# Patient Record
Sex: MALE | Race: WHITE | NOT HISPANIC OR LATINO | Employment: FULL TIME | URBAN - METROPOLITAN AREA
[De-identification: names, ages, dates, MRNs, and addresses within clinical notes are randomized per-mention and may not be internally consistent; named-entity substitution may affect disease eponyms.]

---

## 2018-02-15 DIAGNOSIS — R06.02 SOB (SHORTNESS OF BREATH): Primary | ICD-10-CM

## 2018-02-16 ENCOUNTER — OFFICE VISIT (OUTPATIENT)
Dept: PULMONOLOGY | Facility: CLINIC | Age: 43
End: 2018-02-16
Payer: COMMERCIAL

## 2018-02-16 ENCOUNTER — PROCEDURE VISIT (OUTPATIENT)
Dept: PULMONOLOGY | Facility: CLINIC | Age: 43
End: 2018-02-16
Payer: COMMERCIAL

## 2018-02-16 ENCOUNTER — HOSPITAL ENCOUNTER (OUTPATIENT)
Dept: RADIOLOGY | Facility: HOSPITAL | Age: 43
Discharge: HOME | End: 2018-02-16
Attending: INTERNAL MEDICINE
Payer: COMMERCIAL

## 2018-02-16 VITALS
RESPIRATION RATE: 19 BRPM | HEART RATE: 86 BPM | WEIGHT: 220.13 LBS | OXYGEN SATURATION: 96 % | SYSTOLIC BLOOD PRESSURE: 128 MMHG | BODY MASS INDEX: 32.6 KG/M2 | HEIGHT: 69 IN | DIASTOLIC BLOOD PRESSURE: 82 MMHG

## 2018-02-16 DIAGNOSIS — R06.02 SOB (SHORTNESS OF BREATH): ICD-10-CM

## 2018-02-16 DIAGNOSIS — J45.30 MILD PERSISTENT ASTHMA WITHOUT COMPLICATION: Primary | ICD-10-CM

## 2018-02-16 LAB
PRE DLCO: 34.23 ML/MMHG/MIN (ref 25.97–34.26)
PRE ERV: 0.48 L
PRE FEF 25 75: 1.83 L/S (ref 3–4.54)
PRE FET 100: 12.06 S
PRE FEV1 FVC: 72 %
PRE FEV1: 2.85 L (ref 3.64–4.4)
PRE FIF 50: 2.26 L/S
PRE FRC PL: 2.88 L (ref 2.65–3.86)
PRE FVC: 3.97 L (ref 4.62–5.52)
PRE KROGHS K: 4.03 1/MIN
PRE PEF: 8.21 L/S (ref 8.75–11)
PRE RV: 1.73 L (ref 1.6–2.39)
PRE SVC: 3.97 L
PRE TLC: 5.7 L (ref 6.19–7.19)
PREDICTED DLCO: 30.11 ML/MMHG/MIN (ref 25.97–34.26)
PREDICTED FEV1 FVC: 79.39 % (ref 74.55–84.23)
PREDICTED FEV1: 4.02 L (ref 3.64–4.4)
PREDICTED FRC N2: 3.25 L (ref 2.65–3.86)
PREDICTED FRC PL: 3.25 L (ref 2.65–3.86)
PREDICTED FVC: 5.07 L (ref 4.62–5.52)
PREDICTED RV: 2 L (ref 1.6–2.39)
PREDICTED SVC: 4.74 L
PREDICTED TLC: 6.69 L (ref 6.19–7.19)
PROVOCATION PROTOCOL: ABNORMAL

## 2018-02-16 PROCEDURE — 71046 X-RAY EXAM CHEST 2 VIEWS: CPT | Mod: TC,FY,PO

## 2018-02-16 PROCEDURE — 94010 BREATHING CAPACITY TEST: CPT | Mod: S$GLB,,, | Performed by: INTERNAL MEDICINE

## 2018-02-16 PROCEDURE — 99999 PR PBB SHADOW E&M-EST. PATIENT-LVL III: CPT | Mod: PBBFAC,,, | Performed by: INTERNAL MEDICINE

## 2018-02-16 PROCEDURE — 99204 OFFICE O/P NEW MOD 45 MIN: CPT | Mod: 25,S$GLB,, | Performed by: INTERNAL MEDICINE

## 2018-02-16 PROCEDURE — 94729 DIFFUSING CAPACITY: CPT | Mod: S$GLB,,, | Performed by: INTERNAL MEDICINE

## 2018-02-16 PROCEDURE — 94726 PLETHYSMOGRAPHY LUNG VOLUMES: CPT | Mod: S$GLB,,, | Performed by: INTERNAL MEDICINE

## 2018-02-16 PROCEDURE — 71046 X-RAY EXAM CHEST 2 VIEWS: CPT | Mod: 26,,, | Performed by: RADIOLOGY

## 2018-02-16 PROCEDURE — 3008F BODY MASS INDEX DOCD: CPT | Mod: S$GLB,,, | Performed by: INTERNAL MEDICINE

## 2018-02-16 RX ORDER — FLUTICASONE FUROATE AND VILANTEROL 100; 25 UG/1; UG/1
1 POWDER RESPIRATORY (INHALATION) DAILY
COMMUNITY
End: 2018-02-16 | Stop reason: SDUPTHER

## 2018-02-16 RX ORDER — ALBUTEROL SULFATE 0.83 MG/ML
2.5 SOLUTION RESPIRATORY (INHALATION) EVERY 6 HOURS PRN
COMMUNITY
End: 2018-02-16 | Stop reason: CLARIF

## 2018-02-16 RX ORDER — OMEPRAZOLE 20 MG/1
20 TABLET, DELAYED RELEASE ORAL DAILY
COMMUNITY
End: 2021-06-04

## 2018-02-16 RX ORDER — ALBUTEROL SULFATE 90 UG/1
2 AEROSOL, METERED RESPIRATORY (INHALATION) EVERY 6 HOURS PRN
Qty: 18 G | Refills: 0 | Status: SHIPPED | OUTPATIENT
Start: 2018-02-16 | End: 2020-03-27

## 2018-02-16 RX ORDER — FLUTICASONE FUROATE AND VILANTEROL 100; 25 UG/1; UG/1
1 POWDER RESPIRATORY (INHALATION) DAILY
Qty: 60 EACH | Refills: 3 | Status: SHIPPED | OUTPATIENT
Start: 2018-02-16 | End: 2020-03-27 | Stop reason: SDUPTHER

## 2018-02-16 NOTE — PROGRESS NOTES
Initial Outpatient Pulmonary Evaluation       SUBJECTIVE:     History of Present Illness:  Patient is a 42 y.o. male presenting for evaluation after having abnormal spirometry, during his routine health exam at his job. He is waiting to be evaluated by pulmonary before resuming his job. He is   The patient is known with past medical history of asthma since childhood, according to the patient history his asthma seems mild and he has been on Advair 3 years ago for a year then recently over the last year and a half he was supposed to be on Breo 100 µg 1 puff daily however he was not persistently using it.   He denies hospital admission due to asthma, however he has had multiple doctor visits due to uncontrolled asthma.   He has intermittent dry cough, intermittent wheezing, chest tightness and SOB.  He uses albuterol on average once every 2-3 weeks.  He denies nighttime symptoms.  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  No recent sick contacts.     Chief Complaint   Patient presents with    Asthma       Review of patient's allergies indicates:  No Known Allergies    Current Outpatient Prescriptions   Medication Sig Dispense Refill    fluticasone-vilanterol (BREO ELLIPTA) 100-25 mcg/dose diskus inhaler Inhale 1 puff into the lungs once daily. Controller 60 each 3    omeprazole (PRILOSEC OTC) 20 MG tablet Take 20 mg by mouth once daily.      albuterol 90 mcg/actuation inhaler Inhale 2 puffs into the lungs every 6 (six) hours as needed for Wheezing. Rescue 18 g 0     No current facility-administered medications for this visit.        History reviewed. No pertinent past medical history.  History reviewed. No pertinent surgical history.  Family History   Problem Relation Age of Onset    Cancer Father     Cancer Sister      Social History   Substance Use Topics    Smoking status: Never Smoker    Smokeless tobacco: Never  "Used    Alcohol use Yes      Comment: socially        Review of Systems:  Review of Systems   Constitutional: Negative for chills, fever and unexpected weight change.   HENT: Negative for congestion.    Respiratory: Positive for cough, chest tightness, shortness of breath and wheezing.    Cardiovascular: Negative.    Gastrointestinal: Negative for abdominal pain.   Genitourinary: Negative for hematuria.   Musculoskeletal: Negative for back pain and gait problem.   Allergic/Immunologic: Negative for immunocompromised state.   Neurological: Negative for speech difficulty and weakness.   Hematological: Negative.    Psychiatric/Behavioral: Negative for agitation, behavioral problems and confusion.       OBJECTIVE:     Vital Signs (Most Recent)  Pulse: 86 (02/16/18 1552)  Resp: 19 (02/16/18 1552)  BP: 128/82 (02/16/18 1552)  SpO2: 96 % (02/16/18 1552)  5' 9" (1.753 m)  99.9 kg (220 lb 2.1 oz)     Physical Exam:  Physical Exam   Constitutional: He is oriented to person, place, and time. He appears well-developed and well-nourished.   HENT:   Head: Normocephalic and atraumatic.   Eyes: EOM are normal.   Neck: Neck supple.   Cardiovascular: Normal rate and regular rhythm.    Pulmonary/Chest: Effort normal. No respiratory distress. He has wheezes. He has no rales. He exhibits no tenderness.   Few scattered faint end expiratory wheezing bilaterally.   Abdominal: Soft. There is no tenderness.   Musculoskeletal: He exhibits no edema or deformity.   Neurological: He is alert and oriented to person, place, and time.   Skin: Skin is warm.       Laboratory    Lab Results   Component Value Date    WBC 8.09 02/27/2014    HGB 13.9 (L) 02/27/2014    HCT 42.0 02/27/2014    MCV 85 02/27/2014     02/27/2014     BMP  Lab Results   Component Value Date     02/27/2014    K 4.3 02/27/2014     02/27/2014    CO2 27 02/27/2014    BUN 19 02/27/2014    CREATININE 1.2 02/27/2014    CALCIUM 9.4 02/27/2014    ANIONGAP 9 02/27/2014 "    ESTGFRAFRICA >60.0 02/27/2014    EGFRNONAA >60.0 02/27/2014     BNP  @LABRCNTIP(BNP,BNPTRIAGEBLO)@  No results found for: TSH  ABG  @LABRCNTIP(PH,PO2,PCO2,HCO3,BE)@    Diagnostic Results:    Chest x-ray done today showed no acute findings and was postoperatively reviewed.  Spirometry done at the patient's job showed an FEV1 of 63% .     PFT done today was reviewed and showed small airways obstruction and reduced FEV1, mildly reduced FVC.  FEV1 over FVC 70%, TLC  lung volume was normal, DLCO was normal.  Bronchodilation was not assessed.      ASSESSMENT/PLAN:     Mild persistent asthma without complication  -     fluticasone-vilanterol (BREO ELLIPTA) 100-25 mcg/dose diskus inhaler; Inhale 1 puff into the lungs once daily. Controller  Dispense: 60 each; Refill: 3  -     albuterol 90 mcg/actuation inhaler; Inhale 2 puffs into the lungs every 6 (six) hours as needed for Wheezing. Rescue  Dispense: 18 g; Refill: 0  -     Spirometry with/without bronchodilator; Future    I have instructed the patient and advised him to use albuterol as a rescue inhaler as needed for shortness of breath, cough and wheezing.  I have also educated him about Breo, that it is a control inhaler that needs to be taken as prescribed on a daily basis.   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   I will repeat his spirometry in 1 week before making further recommendation    Follow-up in about 1 week (around 2/23/2018).    This note was prepared using voice recognition system and is likely to have sound alike errors that may have been overlooked even after proof reading.  Please call me with any questions    Discussed diagnosis, its evaluation, treatment and usual course. All questions answered.    Thank you for the courtesy of participating in the care of this patient    Bob Choudhary MD

## 2018-03-01 ENCOUNTER — OFFICE VISIT (OUTPATIENT)
Dept: PULMONOLOGY | Facility: CLINIC | Age: 43
End: 2018-03-01
Payer: COMMERCIAL

## 2018-03-01 ENCOUNTER — PROCEDURE VISIT (OUTPATIENT)
Dept: PULMONOLOGY | Facility: CLINIC | Age: 43
End: 2018-03-01
Payer: COMMERCIAL

## 2018-03-01 VITALS
DIASTOLIC BLOOD PRESSURE: 70 MMHG | HEART RATE: 77 BPM | HEIGHT: 69 IN | WEIGHT: 220.25 LBS | RESPIRATION RATE: 16 BRPM | BODY MASS INDEX: 32.62 KG/M2 | SYSTOLIC BLOOD PRESSURE: 136 MMHG | OXYGEN SATURATION: 98 %

## 2018-03-01 DIAGNOSIS — J45.30 MILD PERSISTENT ASTHMA WITHOUT COMPLICATION: ICD-10-CM

## 2018-03-01 DIAGNOSIS — G47.33 OSA ON CPAP: ICD-10-CM

## 2018-03-01 DIAGNOSIS — J45.30 MILD PERSISTENT ASTHMA, UNCOMPLICATED: Primary | ICD-10-CM

## 2018-03-01 DIAGNOSIS — K21.9 CHRONIC GERD: ICD-10-CM

## 2018-03-01 LAB
PRE FEF 25 75: 2.1 L/S (ref 3–4.54)
PRE FET 100: 11.34 S
PRE FEV1 FVC: 75 %
PRE FEV1: 2.82 L (ref 3.64–4.4)
PRE FIF 50: 3.87 L/S
PRE FVC: 3.77 L (ref 4.62–5.52)
PRE PEF: 8.34 L/S (ref 8.75–11)
PREDICTED FEV1 FVC: 79.39 % (ref 74.55–84.23)
PREDICTED FEV1: 4.02 L (ref 3.64–4.4)
PREDICTED FVC: 5.07 L (ref 4.62–5.52)
PROVOCATION PROTOCOL: ABNORMAL

## 2018-03-01 PROCEDURE — 99214 OFFICE O/P EST MOD 30 MIN: CPT | Mod: S$GLB,,, | Performed by: INTERNAL MEDICINE

## 2018-03-01 PROCEDURE — 94010 BREATHING CAPACITY TEST: CPT | Mod: S$GLB,,, | Performed by: INTERNAL MEDICINE

## 2018-03-01 PROCEDURE — 99999 PR PBB SHADOW E&M-EST. PATIENT-LVL III: CPT | Mod: PBBFAC,,, | Performed by: INTERNAL MEDICINE

## 2018-03-01 NOTE — PROGRESS NOTES
Pulmonary Outpatient Follow Up Visit     Subjective:       Patient ID: Carlo Julien is a 42 y.o. male.    Chief Complaint: Asthma      HPI  42-year-old male patient evaluated 2 weeks ago for abnormal spirometry and mild persistent asthma.  He comes in today for a follow-up w a repeat spirometry.  He denies coughing, wheezing, shortness of breath.  He did not need his albuterol inhaler over the last 2 weeks.  He is compliant with being 100 µg 1 puff daily.  Patient has severe obstructive sleep apnea on CPAP 11 cm daily.  He has been on CPAP for more than one year and he states that he is compliant with it.  Review of Systems   Constitutional: Negative for fever and chills.   HENT: Negative for trouble swallowing and voice change.         DEBRA   Eyes: Negative for redness.   Respiratory: Negative for cough, shortness of breath and dyspnea on extertion.         History of asthma   Cardiovascular: Negative for chest pain.   Genitourinary: Negative for hematuria.   Endocrine: endocrine negative   Musculoskeletal: Negative for gait problem.   Skin: Negative for rash.   Gastrointestinal:        GERD   Neurological: Negative for syncope.   Hematological: Negative for adenopathy.   Psychiatric/Behavioral: Negative.          Outpatient Encounter Prescriptions as of 3/1/2018   Medication Sig Dispense Refill    fluticasone-vilanterol (BREO ELLIPTA) 100-25 mcg/dose diskus inhaler Inhale 1 puff into the lungs once daily. Controller 60 each 3    albuterol 90 mcg/actuation inhaler Inhale 2 puffs into the lungs every 6 (six) hours as needed for Wheezing. Rescue 18 g 0    omeprazole (PRILOSEC OTC) 20 MG tablet Take 20 mg by mouth once daily.       No facility-administered encounter medications on file as of 3/1/2018.        Objective:          Physical Exam   Constitutional: He is oriented to person, place, and time. He appears well-developed and well-nourished.   HENT:   Head:  Normocephalic.   Neck: Neck supple.   Cardiovascular: Normal rate and regular rhythm.    Pulmonary/Chest: Normal expansion. No respiratory distress. He has no wheezes. He has no rhonchi. He has no rales.   Abdominal: Soft.   Musculoskeletal: He exhibits no edema.   Lymphadenopathy:     He has no axillary adenopathy.   Neurological: He is alert and oriented to person, place, and time.   Skin: Skin is warm. No rash noted.   Psychiatric: He has a normal mood and affect.     Laboratory    Lab Results   Component Value Date    WBC 8.09 02/27/2014    HGB 13.9 (L) 02/27/2014    HCT 42.0 02/27/2014    MCV 85 02/27/2014     02/27/2014     BMP  Lab Results   Component Value Date     02/27/2014    K 4.3 02/27/2014     02/27/2014    CO2 27 02/27/2014    BUN 19 02/27/2014    CREATININE 1.2 02/27/2014    CALCIUM 9.4 02/27/2014    ANIONGAP 9 02/27/2014    ESTGFRAFRICA >60.0 02/27/2014    EGFRNONAA >60.0 02/27/2014       Diagnostic Results:    Pulmonary Function Tests 3/1/2018   SpO2 98   Height 69.000   Weight 3523.83   BMI (Calculated) 32.6   Some recent data might be hidden     Spirometry performed March 1, 2018 was personally reviewed.   FVC is reduced to 74%  FEV1 reduced to 70%  FEV1/FVC 75%  Bronchodilation not performed  FEF 25-75 percent is 56% this has increased from 49% on the PFT 2 weeks ago    Chest x-ray from February 16, 2018 was in no acute findings.  Assessment/Plan:   Mild persistent asthma, uncomplicated    DEBRA on CPAP    Chronic GERD      Continue albuterol as needed.    Continue Breo 100 µg 1 puff daily.  Continue CPAP 11 cm water during sleep.  Continue PPI.  The patient spirometry shows an improvement in FEF 2575%.  FEV1 and FVC are stable.  The patient is compliant with his inhalers.  He is asymptomatic.  Not needing albuterol.  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    Follow-up in about 3 months (around 6/1/2018).    This note was prepared using  voice recognition system and is likely to have sound alike errors that may have been overlooked even after proof reading.  Please call me with any questions    Discussed diagnosis, its evaluation, treatment and usual course. All questions answered.    Thank you for the courtesy of participating in the care of this patient    Bob Choudhary MD

## 2020-03-26 ENCOUNTER — TELEPHONE (OUTPATIENT)
Dept: FAMILY MEDICINE | Facility: CLINIC | Age: 45
End: 2020-03-26

## 2020-03-26 NOTE — TELEPHONE ENCOUNTER
----- Message from Lawanda Sanders MA sent at 3/26/2020 12:43 PM CDT -----  Pt is requesting a refill:    Breo Inhaler    Pharmacy - [facility] on [address] in [place]    Pt - [phone]

## 2020-03-27 ENCOUNTER — OFFICE VISIT (OUTPATIENT)
Dept: FAMILY MEDICINE | Facility: CLINIC | Age: 45
End: 2020-03-27
Payer: COMMERCIAL

## 2020-03-27 DIAGNOSIS — Z99.89 DEPENDENCE ON OTHER ENABLING MACHINES AND DEVICES: ICD-10-CM

## 2020-03-27 DIAGNOSIS — J45.20 MILD INTERMITTENT ASTHMA WITHOUT COMPLICATION: Primary | ICD-10-CM

## 2020-03-27 DIAGNOSIS — K21.9 GASTRO-ESOPHAGEAL REFLUX DISEASE WITHOUT ESOPHAGITIS: ICD-10-CM

## 2020-03-27 DIAGNOSIS — G47.33 OBSTRUCTIVE SLEEP APNEA: ICD-10-CM

## 2020-03-27 DIAGNOSIS — J45.30 MILD PERSISTENT ASTHMA WITHOUT COMPLICATION: ICD-10-CM

## 2020-03-27 PROCEDURE — 99213 OFFICE O/P EST LOW 20 MIN: CPT | Mod: 95,,, | Performed by: FAMILY MEDICINE

## 2020-03-27 PROCEDURE — 99213 PR OFFICE/OUTPT VISIT, EST, LEVL III, 20-29 MIN: ICD-10-PCS | Mod: 95,,, | Performed by: FAMILY MEDICINE

## 2020-03-27 RX ORDER — ALBUTEROL SULFATE 90 UG/1
2 AEROSOL, METERED RESPIRATORY (INHALATION) EVERY 6 HOURS PRN
Qty: 18 G | Refills: 0 | Status: SHIPPED | OUTPATIENT
Start: 2020-03-27 | End: 2021-06-04 | Stop reason: SDUPTHER

## 2020-03-27 RX ORDER — FLUTICASONE FUROATE AND VILANTEROL 100; 25 UG/1; UG/1
1 POWDER RESPIRATORY (INHALATION) DAILY
Qty: 60 EACH | Refills: 5 | Status: SHIPPED | OUTPATIENT
Start: 2020-03-27 | End: 2020-08-20 | Stop reason: SDUPTHER

## 2020-03-27 RX ORDER — OMEPRAZOLE 20 MG/1
20 CAPSULE, DELAYED RELEASE ORAL DAILY
Qty: 90 CAPSULE | Refills: 1 | Status: SHIPPED | OUTPATIENT
Start: 2020-03-27 | End: 2021-06-11

## 2020-03-27 NOTE — PROGRESS NOTES
SUBJECTIVE:    Patient ID: Carlo Julien is a 44 y.o. male.    Chief Complaint: No chief complaint on file.    This 44-year-old male is here for a telemedicine visit.  He works on a tub boat as a  and .  He works 7 on and 7 off.  Not much heavy lifting according to him.  He states he has asthma and is well maintained on Breo inhaler and uses a ProAir for rescue inhaler.  No severe shortness of breath no ER visits recently for asthma.  He has obstructive sleep apnea and uses a CPAP at night from Dr. Jose.    He takes omeprazole daily for GERD.      No visits with results within 6 Month(s) from this visit.   Latest known visit with results is:   Procedure visit on 03/01/2018   Component Date Value Ref Range Status    Provocation Protocol 03/01/2018 ---   Final    Pre FVC 03/01/2018 3.77* 4.62 - 5.52 L Final    Pre FEV1 03/01/2018 2.82* 3.64 - 4.4 L Final    Pre FEF 25 75 03/01/2018 2.1* 3 - 4.54 L/s Final    Pre PEF 03/01/2018 8.34* 8.75 - 11 L/s Final    Pre  03/01/2018 11.34  s Final    Pre FIF 50 03/01/2018 3.87  L/s Final    Pre FEV1 FVC 03/01/2018 75  % Final    Predicted FEV1 03/01/2018 4.02  3.64 - 4.4 L Final    Predicted FVC 03/01/2018 5.07  4.62 - 5.52 L Final    Predicted FEV1 FVC 03/01/2018 79.39  74.55 - 84.23 % Final       Past Medical History:   Diagnosis Date    Asthma      No past surgical history on file.  Family History   Problem Relation Age of Onset    Cancer Father     Cancer Sister        Marital Status:   Alcohol History:  reports that he drinks alcohol.  Tobacco History:  reports that he has never smoked. He has never used smokeless tobacco.  Drug History:  reports that he does not use drugs.    Review of patient's allergies indicates:  No Known Allergies    Current Outpatient Medications:     albuterol (PROAIR HFA) 90 mcg/actuation inhaler, Inhale 2 puffs into the lungs every 6 (six) hours as needed for Wheezing. Rescue, Disp: 18  g, Rfl: 0    fluticasone furoate-vilanteroL (BREO ELLIPTA) 100-25 mcg/dose diskus inhaler, Inhale 1 puff into the lungs once daily. Controller, Disp: 60 each, Rfl: 5    omeprazole (PRILOSEC OTC) 20 MG tablet, Take 20 mg by mouth once daily., Disp: , Rfl:     omeprazole (PRILOSEC) 20 MG capsule, Take 1 capsule (20 mg total) by mouth once daily., Disp: 90 capsule, Rfl: 1    Review of Systems   Constitutional: Negative for appetite change, chills, fatigue, fever and unexpected weight change.   HENT: Positive for congestion. Negative for ear pain and trouble swallowing.    Eyes: Negative for pain, discharge and visual disturbance.   Respiratory: Negative for apnea, cough, shortness of breath and wheezing.         DEBRA on CPAP   Cardiovascular: Negative for chest pain and leg swelling.   Gastrointestinal: Negative for abdominal pain, blood in stool, constipation, diarrhea, nausea and vomiting.        GERD well controlled with the omeprazole.   Endocrine: Negative for cold intolerance, heat intolerance and polydipsia.   Genitourinary: Negative for dysuria, hematuria, testicular pain and urgency.   Musculoskeletal: Negative for gait problem, joint swelling and myalgias.   Neurological: Negative for dizziness, seizures and numbness.   Psychiatric/Behavioral: Negative for agitation, behavioral problems and hallucinations. The patient is not nervous/anxious.           Objective:      There were no vitals filed for this visit.  There is no height or weight on file to calculate BMI.  Physical Exam      Assessment:       1. Mild intermittent asthma without complication    2. Gastro-esophageal reflux disease without esophagitis    3. Obstructive sleep apnea    4. Dependence on other enabling machines and devices    5. Mild persistent asthma without complication         Plan:       Mild intermittent asthma without complication  Breo for maintenance and ProAir inhaler for rescue.  Gastro-esophageal reflux disease without  esophagitis  Omeprazole 20 mg daily  Obstructive sleep apnea  Continue current CPAP machine.  Patient has moved to the [facility] and may wish to change pulmonologist group  Dependence on other enabling machines and devices  Continue current CPAP    Follow up in about 6 months (around 9/27/2020).      The patient location is:  home  Visit type: Virtual visit with synchronous audio and video    Total time spent with patient: 20     Each patient to whom he or she provides medical services by telemedicine is:  (1) informed of the relationship between the physician and patient and the respective role of any other health care provider with respect to management of the patient; and (2) notified that he or she may decline to receive medical services by telemedicine and may withdraw from such care at any time.     This note was created using [employer] voice recognition software that occasionally misinterprets phrases or words.

## 2020-08-20 DIAGNOSIS — J45.30 MILD PERSISTENT ASTHMA WITHOUT COMPLICATION: ICD-10-CM

## 2020-08-20 RX ORDER — FLUTICASONE FUROATE AND VILANTEROL TRIFENATATE 100; 25 UG/1; UG/1
1 POWDER RESPIRATORY (INHALATION) DAILY
Qty: 3 EACH | Refills: 3 | Status: SHIPPED | OUTPATIENT
Start: 2020-08-20 | End: 2021-06-04 | Stop reason: SDUPTHER

## 2020-08-20 NOTE — TELEPHONE ENCOUNTER
----- Message from Saurabh Swift sent at 8/20/2020 10:47 AM CDT -----  Regarding: refills  Refill on brero   [facility]   Pt [phone]

## 2021-01-25 ENCOUNTER — CLINICAL SUPPORT (OUTPATIENT)
Dept: URGENT CARE | Facility: CLINIC | Age: 46
End: 2021-01-25
Payer: COMMERCIAL

## 2021-01-25 VITALS — TEMPERATURE: 98 F | HEART RATE: 81 BPM | OXYGEN SATURATION: 97 %

## 2021-01-25 DIAGNOSIS — Z11.59 ENCOUNTER FOR SCREENING FOR OTHER VIRAL DISEASES: Primary | ICD-10-CM

## 2021-01-25 LAB
CTP QC/QA: YES
SARS-COV-2 RDRP RESP QL NAA+PROBE: NEGATIVE

## 2021-01-25 PROCEDURE — U0002 COVID-19 LAB TEST NON-CDC: HCPCS | Mod: QW,S$GLB,, | Performed by: STUDENT IN AN ORGANIZED HEALTH CARE EDUCATION/TRAINING PROGRAM

## 2021-01-25 PROCEDURE — U0002: ICD-10-PCS | Mod: QW,S$GLB,, | Performed by: STUDENT IN AN ORGANIZED HEALTH CARE EDUCATION/TRAINING PROGRAM

## 2021-04-29 ENCOUNTER — PATIENT MESSAGE (OUTPATIENT)
Dept: RESEARCH | Facility: HOSPITAL | Age: 46
End: 2021-04-29

## 2021-07-01 ENCOUNTER — PATIENT MESSAGE (OUTPATIENT)
Dept: ADMINISTRATIVE | Facility: OTHER | Age: 46
End: 2021-07-01

## 2021-07-06 ENCOUNTER — CLINICAL SUPPORT (OUTPATIENT)
Dept: URGENT CARE | Facility: CLINIC | Age: 46
End: 2021-07-06
Payer: COMMERCIAL

## 2021-07-06 DIAGNOSIS — Z11.59 ENCOUNTER FOR SCREENING FOR OTHER VIRAL DISEASES: Primary | ICD-10-CM

## 2021-07-06 DIAGNOSIS — U07.1 COVID-19 VIRUS DETECTED: ICD-10-CM

## 2021-07-06 LAB
CTP QC/QA: YES
SARS-COV-2 RDRP RESP QL NAA+PROBE: POSITIVE

## 2021-07-06 PROCEDURE — U0002 COVID-19 LAB TEST NON-CDC: HCPCS | Mod: QW,S$GLB,, | Performed by: NURSE PRACTITIONER

## 2021-07-06 PROCEDURE — U0002: ICD-10-PCS | Mod: QW,S$GLB,, | Performed by: NURSE PRACTITIONER

## 2023-01-20 PROBLEM — J45.30 MILD PERSISTENT ASTHMA WITHOUT COMPLICATION: Status: ACTIVE | Noted: 2023-01-20

## 2023-01-20 PROBLEM — E78.2 MIXED HYPERLIPIDEMIA: Status: ACTIVE | Noted: 2023-01-20

## 2023-01-20 PROBLEM — R73.9 HYPERGLYCEMIA: Status: ACTIVE | Noted: 2023-01-20

## 2023-12-07 PROBLEM — Z86.16 HISTORY OF COVID-19: Status: ACTIVE | Noted: 2022-09-13

## 2023-12-07 PROBLEM — Z99.89 DEPENDENCE ON OTHER ENABLING MACHINES AND DEVICES: Status: RESOLVED | Noted: 2018-11-12 | Resolved: 2023-12-07

## 2024-06-28 PROBLEM — E66.811 OBESITY (BMI 30.0-34.9): Status: ACTIVE | Noted: 2024-06-28

## 2024-12-31 PROBLEM — E66.01 SEVERE OBESITY (BMI 35.0-39.9) WITH COMORBIDITY: Status: ACTIVE | Noted: 2024-12-31

## 2024-12-31 PROBLEM — R03.0 ELEVATED BP WITHOUT DIAGNOSIS OF HYPERTENSION: Status: ACTIVE | Noted: 2024-12-31

## 2025-06-25 ENCOUNTER — HOSPITAL ENCOUNTER (EMERGENCY)
Facility: HOSPITAL | Age: 50
Discharge: HOME | End: 2025-06-25
Attending: STUDENT IN AN ORGANIZED HEALTH CARE EDUCATION/TRAINING PROGRAM
Payer: COMMERCIAL

## 2025-06-25 VITALS
WEIGHT: 230 LBS | TEMPERATURE: 98 F | OXYGEN SATURATION: 96 % | HEIGHT: 69 IN | SYSTOLIC BLOOD PRESSURE: 134 MMHG | DIASTOLIC BLOOD PRESSURE: 81 MMHG | BODY MASS INDEX: 34.07 KG/M2 | HEART RATE: 67 BPM | RESPIRATION RATE: 18 BRPM

## 2025-06-25 DIAGNOSIS — R03.0 ELEVATED BLOOD PRESSURE READING: Primary | ICD-10-CM

## 2025-06-25 LAB
ABSOLUTE EOSINOPHIL (OHS): 0.29 K/UL
ABSOLUTE MONOCYTE (OHS): 0.72 K/UL (ref 0.3–1)
ABSOLUTE NEUTROPHIL COUNT (OHS): 3.53 K/UL (ref 1.8–7.7)
ALBUMIN SERPL BCP-MCNC: 4.3 G/DL (ref 3.5–5.2)
ALP SERPL-CCNC: 58 UNIT/L (ref 40–150)
ALT SERPL W/O P-5'-P-CCNC: 24 UNIT/L (ref 10–44)
ANION GAP (OHS): 8 MMOL/L (ref 8–16)
AST SERPL-CCNC: 18 UNIT/L (ref 11–45)
BASOPHILS # BLD AUTO: 0.07 K/UL
BASOPHILS NFR BLD AUTO: 0.8 %
BILIRUB SERPL-MCNC: 0.5 MG/DL (ref 0.1–1)
BUN SERPL-MCNC: 15 MG/DL (ref 6–20)
CALCIUM SERPL-MCNC: 8.9 MG/DL (ref 8.7–10.5)
CHLORIDE SERPL-SCNC: 107 MMOL/L (ref 95–110)
CO2 SERPL-SCNC: 26 MMOL/L (ref 23–29)
CREAT SERPL-MCNC: 1 MG/DL (ref 0.5–1.4)
ERYTHROCYTE [DISTWIDTH] IN BLOOD BY AUTOMATED COUNT: 13 % (ref 11.5–14.5)
GFR SERPLBLD CREATININE-BSD FMLA CKD-EPI: >60 ML/MIN/1.73/M2
GLUCOSE SERPL-MCNC: 107 MG/DL (ref 70–110)
HCT VFR BLD AUTO: 43.4 % (ref 40–54)
HGB BLD-MCNC: 14 GM/DL (ref 14–18)
IMM GRANULOCYTES # BLD AUTO: 0.03 K/UL (ref 0–0.04)
IMM GRANULOCYTES NFR BLD AUTO: 0.4 % (ref 0–0.5)
LYMPHOCYTES # BLD AUTO: 3.73 K/UL (ref 1–4.8)
MCH RBC QN AUTO: 28.4 PG (ref 27–31)
MCHC RBC AUTO-ENTMCNC: 32.3 G/DL (ref 32–36)
MCV RBC AUTO: 88 FL (ref 82–98)
NUCLEATED RBC (/100WBC) (OHS): 0 /100 WBC
OHS QRS DURATION: 98 MS
OHS QTC CALCULATION: 429 MS
PLATELET # BLD AUTO: 318 K/UL (ref 150–450)
PMV BLD AUTO: 9.1 FL (ref 9.2–12.9)
POTASSIUM SERPL-SCNC: 4.1 MMOL/L (ref 3.5–5.1)
PROT SERPL-MCNC: 7.5 GM/DL (ref 6–8.4)
RBC # BLD AUTO: 4.93 M/UL (ref 4.6–6.2)
RELATIVE EOSINOPHIL (OHS): 3.5 %
RELATIVE LYMPHOCYTE (OHS): 44.6 % (ref 18–48)
RELATIVE MONOCYTE (OHS): 8.6 % (ref 4–15)
RELATIVE NEUTROPHIL (OHS): 42.1 % (ref 38–73)
SODIUM SERPL-SCNC: 141 MMOL/L (ref 136–145)
WBC # BLD AUTO: 8.37 K/UL (ref 3.9–12.7)

## 2025-06-25 PROCEDURE — 82040 ASSAY OF SERUM ALBUMIN: CPT | Performed by: STUDENT IN AN ORGANIZED HEALTH CARE EDUCATION/TRAINING PROGRAM

## 2025-06-25 PROCEDURE — 85025 COMPLETE CBC W/AUTO DIFF WBC: CPT | Performed by: STUDENT IN AN ORGANIZED HEALTH CARE EDUCATION/TRAINING PROGRAM

## 2025-06-25 PROCEDURE — 93005 ELECTROCARDIOGRAM TRACING: CPT

## 2025-06-25 PROCEDURE — 99284 EMERGENCY DEPT VISIT MOD MDM: CPT | Mod: 25

## 2025-06-25 PROCEDURE — 93010 ELECTROCARDIOGRAM REPORT: CPT | Mod: ,,, | Performed by: STUDENT IN AN ORGANIZED HEALTH CARE EDUCATION/TRAINING PROGRAM

## 2025-06-25 PROCEDURE — 25000003 PHARM REV CODE 250: Performed by: STUDENT IN AN ORGANIZED HEALTH CARE EDUCATION/TRAINING PROGRAM

## 2025-06-25 PROCEDURE — 96360 HYDRATION IV INFUSION INIT: CPT

## 2025-06-25 RX ORDER — ATORVASTATIN CALCIUM 10 MG/1
20 TABLET, FILM COATED ORAL DAILY
Qty: 60 TABLET | Refills: 0 | Status: SHIPPED | OUTPATIENT
Start: 2025-06-25 | End: 2025-07-25

## 2025-06-25 RX ADMIN — SODIUM CHLORIDE 500 ML: 9 INJECTION, SOLUTION INTRAVENOUS at 10:06

## 2025-06-25 NOTE — DISCHARGE INSTRUCTIONS
You were seen today for an elevated blood pressure reading.  Here in the emergency department, your blood pressure is now normal range.  I recommend that you continue keeping a blood pressure log and check your blood pressure twice daily, once in the morning and once in the evening.  It is important that you follow up with your primary care doctor within the next week.  Please bring your blood pressure log to your appointment at that time.  In the meantime, if you develop any chest pain, shortness breath, headache, or any other worsening symptoms or concerns, please return to the emergency department for re-evaluation.

## 2025-06-25 NOTE — Clinical Note
"Carlo Pennington" Anaya was seen and treated in our emergency department on 6/25/2025.  He may return to work on 06/26/2025.       If you have any questions or concerns, please don't hesitate to call.      Thais Shi MD"

## 2025-06-25 NOTE — ED PROVIDER NOTES
Encounter Date: 6/25/2025       History     Chief Complaint   Patient presents with    Hypertension     Pt reports he took his bp at work due to ringing in the ears. Pt states it was in the 160s systolically. Denies cp, sob, vision changes, or ha. No hx     HPI  Patient is a 50-year-old male with history of asthma, GERD, dyslipidemia here for concerns for elevated blood pressure reading.  Patient states that he was at work today and noticed some brief ringing in his ears that has now resolved.  He took his blood pressure and it was noted to be 160s systolic and was concerned and came to the emergency department.  He had no chest pain, shortness of breath, headache, weakness, paresthesias, abdominal pain.  Symptoms have resolved but he is concerned about his elevated blood pressure reading.  He has no documented history of hypertension but is being monitored for it by his doctor.  He states that he normally has a normal blood pressure at his doctor's appointments.  He does report that he used his albuterol inhaler this morning for his asthma.  No current shortness of breath.  He also states that he had a few alcoholic beverages last night.  He is a nonsmoker.    Review of patient's allergies indicates:  No Known Allergies  Past Medical History:   Diagnosis Date    Asthma     Dependence on other enabling machines and devices 11/12/2018    GERD (gastroesophageal reflux disease)     History of COVID-19 09/13/2022    Ulcer of esophagus      Past Surgical History:   Procedure Laterality Date    COLONOSCOPY  01/2023    Dr. Beach     Family History   Problem Relation Name Age of Onset    Cancer Father Diaz Julien     Cancer Sister Jeannie Holder     Heart attack Mother       Social History[1]  Review of Systems  A full ROS was obtained, see HPI for pertinent positives.     Physical Exam     Initial Vitals [06/25/25 0920]   BP Pulse Resp Temp SpO2   (!) 209/92 101 20 98.1 °F (36.7 °C) 99 %      MAP       --          Physical Exam  Constitutional: No acute distress, well-appearing, nontoxic  HENT: Normocephalic, atraumatic  Neck: Supple, normal range of motion  Respiratory: Non-labored, lungs clear  Cardiovascular: Well perfused, normal rate, regular rhythm, no pitting edema  Gastrointestinal: Soft, non-tender, non-distended  Integumentary: Warm and dry  Musculoskeletal: No deformity, no unilateral leg swelling, warmth or erythema  Neurological: Awake and alert, normal motor and sensation in all 4 extremities, cranial nerves 2-12 grossly intact  Psychiatric: Cooperative     ED Course   Procedures  Labs Reviewed   CBC WITH DIFFERENTIAL - Abnormal       Result Value    WBC 8.37      RBC 4.93      HGB 14.0      HCT 43.4      MCV 88      MCH 28.4      MCHC 32.3      RDW 13.0      Platelet Count 318      MPV 9.1 (*)     Nucleated RBC 0      Neut % 42.1      Lymph % 44.6      Mono % 8.6      Eos % 3.5      Basophil % 0.8      Imm Grans % 0.4      Neut # 3.53      Lymph # 3.73      Mono # 0.72      Eos # 0.29      Baso # 0.07      Imm Grans # 0.03     COMPREHENSIVE METABOLIC PANEL - Normal    Sodium 141      Potassium 4.1      Chloride 107      CO2 26      Glucose 107      BUN 15      Creatinine 1.0      Calcium 8.9      Protein Total 7.5      Albumin 4.3      Bilirubin Total 0.5      ALP 58      AST 18      ALT 24      Anion Gap 8      eGFR >60     CBC W/ AUTO DIFFERENTIAL    Narrative:     The following orders were created for panel order CBC auto differential.  Procedure                               Abnormality         Status                     ---------                               -----------         ------                     CBC with Differential[[id number]]       Abnormal            Final result                 Please view results for these tests on the individual orders.        ECG Results              EKG 12-lead (Final result)        Collection Time Result Time QRS Duration OHS QTC Calculation    06/25/25 09:27:29  06/25/25 09:50:31 98 429                     Final result by Interface, [facility] (06/25/25 09:50:35)                   Narrative:    Test Reason : I10,    Vent. Rate :  80 BPM     Atrial Rate :  80 BPM     P-R Int : 188 ms          QRS Dur :  98 ms      QT Int : 372 ms       P-R-T Axes :  42   9  24 degrees    QTcB Int : 429 ms    Normal sinus rhythm  Normal ECG  When compared with ECG of 30-Dec-2024 02:52,  No significant change was found    Confirmed by Dez Garcias ([id number]) on 6/25/2025 9:50:26 AM    Referred By: AAAREFERRAL SELF           Confirmed By: Dez Garcias                                  Imaging Results    None          Medications   sodium chloride 0.9% bolus 500 mL 500 mL (500 mLs Intravenous New Bag 6/25/25 1030)     Medical Decision Making  The patient is a very pleasant 50-year-old male here due to concerns for elevated blood pressure reading.  Patient noticed a brief episode of ringing in his ears earlier today and checked his blood pressure work in his noted to be in the 160s systolic.  His coworkers recommended that he come in for evaluation given his elevated blood pressure reading.  Upon check-in, patient's blood pressure was recorded at 209/92.  He seemed anxious about his blood pressure reading but states that he had just walked all the way from the parking garage.  I repeated the patient's blood pressure on my initial evaluation and it was 137/94.  EKG here is reassuring.  He has no chest pain, shortness of breath, headache.  He has a normal neurologic exam.  He is borderline tachycardic but otherwise his vitals are reassuring.  He does report that he had a few alcoholic beverages last night which could also be contributing to his fluctuating blood pressure readings.  Will hydrate, check labs.  Given his blood pressure is now at a reassuring level, anticipate discharge with close primary care follow up and blood pressure monitoring at home.    Labs reviewed and  reassuring.  Blood pressure now within normal limits.  Patient was counseled on return precautions.  He was advised to continue monitoring his blood pressure at home and keep a log to bring with him to his follow up appointment with his primary care doctor.    Amount and/or Complexity of Data Reviewed  Labs: ordered.               ED Course as of 06/25/25 1152   Wed Jun 25, 2025   0953 EKG with sinus rhythm, rate 80, no STEMI [NN]   1004 I repeated the patient's blood pressure in the room on my initial evaluation and it was 137/94 [NN]      ED Course User Index  [NN] Thais Shi MD                           Clinical Impression:  Final diagnoses:  [R03.0] Elevated blood pressure reading (Primary)          ED Disposition Condition    Discharge Stable          ED Prescriptions    None       Follow-up Information       Follow up With Specialties Details Why Contact Info    Samantha Maldonado, DO Internal Medicine Schedule an appointment as soon as possible for a visit   [address]  [facility] [address]  [phone]      [facility] - Emergency Dept Emergency Medicine  As needed, If symptoms worsen [address]  [facility] [id number] [phone]                   [1]   Social History  Tobacco Use    Smoking status: Never    Smokeless tobacco: Never   Substance Use Topics    Alcohol use: Yes     Comment: rarely    Drug use: No        Thais Shi MD  06/25/25 1786

## 2025-06-25 NOTE — ED TRIAGE NOTES
Pt reports he took his bp at work due to ringing in the ears. Pt states it was in the 160s systolically. Denies cp, sob, vision changes, or ha. No hx . Patient states he took his albuterol inhaler this morning. Patient states he went out last night and was drinking. Patient denies any other symptoms.

## 2025-06-25 NOTE — Clinical Note
"Carlo Pennington" Anaya was seen and treated in our emergency department on 6/25/2025.  He may return to work on 06/25/2025.       If you have any questions or concerns, please don't hesitate to call.      Dr Shi/ ISAI Page    "